# Patient Record
Sex: MALE | HISPANIC OR LATINO | URBAN - METROPOLITAN AREA
[De-identification: names, ages, dates, MRNs, and addresses within clinical notes are randomized per-mention and may not be internally consistent; named-entity substitution may affect disease eponyms.]

---

## 2024-09-23 ENCOUNTER — APPOINTMENT (RX ONLY)
Dept: URBAN - METROPOLITAN AREA CLINIC 166 | Facility: CLINIC | Age: 29
Setting detail: DERMATOLOGY
End: 2024-09-23

## 2024-09-23 DIAGNOSIS — L81.4 OTHER MELANIN HYPERPIGMENTATION: ICD-10-CM

## 2024-09-23 PROCEDURE — ? FULL BODY SKIN EXAM - DECLINED

## 2024-09-23 PROCEDURE — ? COUNSELING

## 2024-09-23 PROCEDURE — ? RECOMMENDATIONS

## 2024-09-23 PROCEDURE — 99202 OFFICE O/P NEW SF 15 MIN: CPT

## 2024-09-23 ASSESSMENT — LOCATION DETAILED DESCRIPTION DERM
LOCATION DETAILED: LEFT ULNAR DORSAL HAND
LOCATION DETAILED: LEFT RADIAL DORSAL HAND

## 2024-09-23 ASSESSMENT — SEVERITY ASSESSMENT: SEVERITY: MILD

## 2024-09-23 ASSESSMENT — LOCATION ZONE DERM: LOCATION ZONE: HAND

## 2024-09-23 ASSESSMENT — LOCATION SIMPLE DESCRIPTION DERM: LOCATION SIMPLE: LEFT HAND

## 2024-09-23 NOTE — PROCEDURE: RECOMMENDATIONS
Detail Level: Zone
Recommendation Preamble: The following recommendations were made during the visit: a consultation with Dr. Gonzalez for treatment options.
Render Risk Assessment In Note?: no